# Patient Record
Sex: FEMALE | Race: ASIAN
[De-identification: names, ages, dates, MRNs, and addresses within clinical notes are randomized per-mention and may not be internally consistent; named-entity substitution may affect disease eponyms.]

---

## 2020-11-03 ENCOUNTER — HOSPITAL ENCOUNTER (OUTPATIENT)
Dept: HOSPITAL 94 - PAS | Age: 55
Discharge: HOME | End: 2020-11-03
Attending: SURGERY
Payer: MEDICARE

## 2020-11-03 VITALS — DIASTOLIC BLOOD PRESSURE: 76 MMHG | SYSTOLIC BLOOD PRESSURE: 118 MMHG

## 2020-11-03 VITALS — SYSTOLIC BLOOD PRESSURE: 138 MMHG | DIASTOLIC BLOOD PRESSURE: 88 MMHG

## 2020-11-03 VITALS — SYSTOLIC BLOOD PRESSURE: 123 MMHG | DIASTOLIC BLOOD PRESSURE: 78 MMHG

## 2020-11-03 VITALS — DIASTOLIC BLOOD PRESSURE: 89 MMHG | SYSTOLIC BLOOD PRESSURE: 150 MMHG

## 2020-11-03 VITALS — DIASTOLIC BLOOD PRESSURE: 78 MMHG | SYSTOLIC BLOOD PRESSURE: 141 MMHG

## 2020-11-03 VITALS — SYSTOLIC BLOOD PRESSURE: 130 MMHG | DIASTOLIC BLOOD PRESSURE: 87 MMHG

## 2020-11-03 VITALS — DIASTOLIC BLOOD PRESSURE: 91 MMHG | SYSTOLIC BLOOD PRESSURE: 140 MMHG

## 2020-11-03 VITALS — SYSTOLIC BLOOD PRESSURE: 126 MMHG | DIASTOLIC BLOOD PRESSURE: 91 MMHG

## 2020-11-03 VITALS — SYSTOLIC BLOOD PRESSURE: 147 MMHG | DIASTOLIC BLOOD PRESSURE: 98 MMHG

## 2020-11-03 VITALS — SYSTOLIC BLOOD PRESSURE: 128 MMHG | DIASTOLIC BLOOD PRESSURE: 88 MMHG

## 2020-11-03 VITALS — DIASTOLIC BLOOD PRESSURE: 87 MMHG | SYSTOLIC BLOOD PRESSURE: 127 MMHG

## 2020-11-03 VITALS — DIASTOLIC BLOOD PRESSURE: 88 MMHG | SYSTOLIC BLOOD PRESSURE: 130 MMHG

## 2020-11-03 VITALS — HEIGHT: 60 IN | BODY MASS INDEX: 33.38 KG/M2 | WEIGHT: 170 LBS

## 2020-11-03 VITALS — DIASTOLIC BLOOD PRESSURE: 86 MMHG | SYSTOLIC BLOOD PRESSURE: 124 MMHG

## 2020-11-03 VITALS — SYSTOLIC BLOOD PRESSURE: 132 MMHG | DIASTOLIC BLOOD PRESSURE: 88 MMHG

## 2020-11-03 DIAGNOSIS — Z85.830: ICD-10-CM

## 2020-11-03 DIAGNOSIS — F32.9: ICD-10-CM

## 2020-11-03 DIAGNOSIS — E11.9: ICD-10-CM

## 2020-11-03 DIAGNOSIS — M19.90: ICD-10-CM

## 2020-11-03 DIAGNOSIS — Z86.73: ICD-10-CM

## 2020-11-03 DIAGNOSIS — I10: ICD-10-CM

## 2020-11-03 DIAGNOSIS — Z98.890: ICD-10-CM

## 2020-11-03 DIAGNOSIS — Z79.899: ICD-10-CM

## 2020-11-03 DIAGNOSIS — E66.9: ICD-10-CM

## 2020-11-03 DIAGNOSIS — K80.10: Primary | ICD-10-CM

## 2020-11-03 LAB
ALBUMIN SERPL BCP-MCNC: 4.2 G/DL (ref 3.4–5)
ALBUMIN/GLOB SERPL: 1.1 {RATIO} (ref 1.1–1.5)
ALP SERPL-CCNC: 63 IU/L (ref 46–116)
ALT SERPL W P-5'-P-CCNC: 42 U/L (ref 30–65)
ANION GAP SERPL CALCULATED.3IONS-SCNC: 7 MMOL/L (ref 8–16)
AST SERPL W P-5'-P-CCNC: 20 U/L (ref 10–37)
BASOPHILS # BLD AUTO: 0 X10'3 (ref 0–0.2)
BASOPHILS NFR BLD AUTO: 0.6 % (ref 0–1)
BILIRUB SERPL-MCNC: 0.7 MG/DL (ref 0–1)
BUN SERPL-MCNC: 13 MG/DL (ref 7–18)
BUN/CREAT SERPL: 18.3 (ref 6.6–38)
CALCIUM SERPL-MCNC: 9.3 MG/DL (ref 8.5–10.1)
CHLORIDE SERPL-SCNC: 97 MMOL/L (ref 99–107)
CO2 SERPL-SCNC: 31.5 MMOL/L (ref 24–32)
CREAT SERPL-MCNC: 0.71 MG/DL (ref 0.4–0.9)
EOSINOPHIL # BLD AUTO: 0.4 X10'3 (ref 0–0.9)
EOSINOPHIL NFR BLD AUTO: 5.6 % (ref 0–6)
ERYTHROCYTE [DISTWIDTH] IN BLOOD BY AUTOMATED COUNT: 13.4 % (ref 11.5–14.5)
GFR SERPL CREATININE-BSD FRML MDRD: 86 ML/MIN
GLUCOSE SERPL-MCNC: 156 MG/DL (ref 70–104)
HCT VFR BLD AUTO: 40.2 % (ref 35–45)
HGB BLD-MCNC: 13.2 G/DL (ref 12–16)
LYMPHOCYTES # BLD AUTO: 2.4 X10'3 (ref 1.1–4.8)
LYMPHOCYTES NFR BLD AUTO: 36.9 % (ref 21–51)
MCH RBC QN AUTO: 26.7 PG (ref 27–31)
MCHC RBC AUTO-ENTMCNC: 32.8 G/DL (ref 33–36.5)
MCV RBC AUTO: 81.5 FL (ref 78–98)
MONOCYTES # BLD AUTO: 0.5 X10'3 (ref 0–0.9)
MONOCYTES NFR BLD AUTO: 8.1 % (ref 2–12)
NEUTROPHILS # BLD AUTO: 3.2 X10'3 (ref 1.8–7.7)
NEUTROPHILS NFR BLD AUTO: 48.8 % (ref 42–75)
PLATELET # BLD AUTO: 274 X10'3 (ref 140–440)
PMV BLD AUTO: 6.8 FL (ref 7.4–10.4)
POTASSIUM SERPL-SCNC: 4.1 MMOL/L (ref 3.4–5.1)
PROT SERPL-MCNC: 8 G/DL (ref 6.4–8.2)
RBC # BLD AUTO: 4.93 X10'6 (ref 4.2–5.6)
SODIUM SERPL-SCNC: 135 MMOL/L (ref 135–145)

## 2020-11-03 PROCEDURE — 85025 COMPLETE CBC W/AUTO DIFF WBC: CPT

## 2020-11-03 PROCEDURE — 47563 LAPARO CHOLECYSTECTOMY/GRAPH: CPT

## 2020-11-03 PROCEDURE — 82948 REAGENT STRIP/BLOOD GLUCOSE: CPT

## 2020-11-03 PROCEDURE — 80053 COMPREHEN METABOLIC PANEL: CPT

## 2020-11-03 PROCEDURE — 93005 ELECTROCARDIOGRAM TRACING: CPT

## 2020-11-03 PROCEDURE — 36415 COLL VENOUS BLD VENIPUNCTURE: CPT

## 2020-11-03 NOTE — NUR
PT GIVEN NORCO FOR ABDOMINAL PAIN AND FOR RIDE HOME AS SHE L[place]ES OUT OF TOWN, PT STATES PAIN 
IS TOLERABLE AND DECLINES ANY OTHER INTERVENTIONS AND THAT SHE WANTS TO GET TO THE RX BEFORE 
THEY CLOSE AS SHE LIV[place] T[place]. D/C INSTRUCTIONS GIVEN AND GONE OVER W/PT WHO 
VERBALIZED UNDERSTANDING. PT D/CD TO HOME VIA W/C TO PRIVATE VEHICLE W/O INCIDENT.

-------------------------------------------------------------------------------

Addendum: 11/03/20 at 1831 by Carissa Noel RN

-------------------------------------------------------------------------------

Amended: Links added.

## 2020-11-03 NOTE — NUR
Received from OR via MAG, accompanied by Anesthesiologist DR LEUNG and report given by 
Anesthesiologist. PT DROWSY, NO S/S OF DISTRESS/DISCOMFORT. ABDOMEN W/4 LAP SITES W/BANDAIDS 
CDI, , DR LEUNG AWARE NOTIFIED.

-------------------------------------------------------------------------------

Addendum: 11/03/20 at 1724 by Carissa Noel RN

-------------------------------------------------------------------------------

Amended: Links added.

## 2022-10-31 ENCOUNTER — HOSPITAL ENCOUNTER (OUTPATIENT)
Dept: HOSPITAL 94 - GI LAB | Age: 57
Discharge: HOME | End: 2022-10-31
Attending: INTERNAL MEDICINE
Payer: MEDICARE

## 2022-10-31 VITALS — DIASTOLIC BLOOD PRESSURE: 73 MMHG | SYSTOLIC BLOOD PRESSURE: 110 MMHG

## 2022-10-31 VITALS — SYSTOLIC BLOOD PRESSURE: 112 MMHG | DIASTOLIC BLOOD PRESSURE: 67 MMHG

## 2022-10-31 VITALS — WEIGHT: 168.21 LBS | BODY MASS INDEX: 33.02 KG/M2 | HEIGHT: 60 IN

## 2022-10-31 VITALS — DIASTOLIC BLOOD PRESSURE: 92 MMHG | SYSTOLIC BLOOD PRESSURE: 156 MMHG

## 2022-10-31 VITALS — SYSTOLIC BLOOD PRESSURE: 113 MMHG | DIASTOLIC BLOOD PRESSURE: 69 MMHG

## 2022-10-31 DIAGNOSIS — Z86.010: ICD-10-CM

## 2022-10-31 DIAGNOSIS — Z08: Primary | ICD-10-CM

## 2022-10-31 DIAGNOSIS — K64.8: ICD-10-CM

## 2022-10-31 DIAGNOSIS — D12.5: ICD-10-CM

## 2022-10-31 DIAGNOSIS — K57.30: ICD-10-CM

## 2022-10-31 PROCEDURE — 45385 COLONOSCOPY W/LESION REMOVAL: CPT

## 2022-10-31 PROCEDURE — 88305 TISSUE EXAM BY PATHOLOGIST: CPT

## 2022-10-31 PROCEDURE — 99152 MOD SED SAME PHYS/QHP 5/>YRS: CPT
